# Patient Record
Sex: FEMALE | URBAN - METROPOLITAN AREA
[De-identification: names, ages, dates, MRNs, and addresses within clinical notes are randomized per-mention and may not be internally consistent; named-entity substitution may affect disease eponyms.]

---

## 2023-03-14 ENCOUNTER — OFFICE (OUTPATIENT)
Facility: LOCATION | Age: 33
Setting detail: OPHTHALMOLOGY
End: 2023-03-14
Payer: COMMERCIAL

## 2023-03-14 VITALS — WEIGHT: 139 LBS | HEIGHT: 60 IN

## 2023-03-14 DIAGNOSIS — H18.601: ICD-10-CM

## 2023-03-14 DIAGNOSIS — H44.23: ICD-10-CM

## 2023-03-14 PROCEDURE — 92004 COMPRE OPH EXAM NEW PT 1/>: CPT | Performed by: OPHTHALMOLOGY

## 2023-03-14 ASSESSMENT — REFRACTION_CURRENTRX
OS_AXIS: 156
OD_SPHERE: -5.25
OS_SPHERE: -5.75
OS_CYLINDER: -0.75
OS_OVR_VA: 20/
OD_AXIS: 036
OD_CYLINDER: -0.50
OS_VPRISM_DIRECTION: SV
OD_VPRISM_DIRECTION: SV
OD_OVR_VA: 20/

## 2023-03-14 ASSESSMENT — KERATOMETRY
OS_K1POWER_DIOPTERS: 46.75
OD_K1POWER_DIOPTERS: 47.25
OD_AXISANGLE_DEGREES: 119
OD_K2POWER_DIOPTERS: 48.25
OS_K2POWER_DIOPTERS: 48.00
OS_AXISANGLE_DEGREES: 069

## 2023-03-14 ASSESSMENT — SPHEQUIV_DERIVED
OD_SPHEQUIV: -7
OS_SPHEQUIV: -7.125

## 2023-03-14 ASSESSMENT — REFRACTION_AUTOREFRACTION
OD_SPHERE: -6.25
OS_SPHERE: -6.25
OS_AXIS: 164
OS_CYLINDER: -1.75
OD_CYLINDER: -1.50
OD_AXIS: 007

## 2023-03-14 ASSESSMENT — CONFRONTATIONAL VISUAL FIELD TEST (CVF)
OS_FINDINGS: FULL
OD_FINDINGS: FULL

## 2023-03-14 ASSESSMENT — TONOMETRY
OD_IOP_MMHG: 16
OS_IOP_MMHG: 14

## 2023-03-14 ASSESSMENT — VISUAL ACUITY
OD_BCVA: 20/30
OS_BCVA: 20/30-2

## 2023-03-14 ASSESSMENT — AXIALLENGTH_DERIVED
OS_AL: 25.0268
OD_AL: 24.8187

## 2024-01-02 ENCOUNTER — EMERGENCY (EMERGENCY)
Facility: HOSPITAL | Age: 34
LOS: 1 days | Discharge: LEFT BEFORE DISCHARGE | End: 2024-01-02
Payer: SELF-PAY

## 2024-01-02 VITALS
OXYGEN SATURATION: 100 % | HEART RATE: 81 BPM | TEMPERATURE: 98 F | WEIGHT: 139.99 LBS | SYSTOLIC BLOOD PRESSURE: 108 MMHG | DIASTOLIC BLOOD PRESSURE: 77 MMHG | HEIGHT: 60 IN | RESPIRATION RATE: 18 BRPM

## 2024-01-02 PROCEDURE — L9991: CPT

## 2024-01-02 NOTE — ED ADULT TRIAGE NOTE - NS ED NURSE AMBULANCES
[facility], Ambulance Department [facility], Ambulance Department

## 2024-01-02 NOTE — ED ADULT TRIAGE NOTE - CHIEF COMPLAINT QUOTE
Pt c/o lower abdominal pain with nausea radiating to groin area x 630pm today. Pt denies vaginal bleeding.